# Patient Record
Sex: FEMALE | NOT HISPANIC OR LATINO | URBAN - METROPOLITAN AREA
[De-identification: names, ages, dates, MRNs, and addresses within clinical notes are randomized per-mention and may not be internally consistent; named-entity substitution may affect disease eponyms.]

---

## 2018-03-07 ENCOUNTER — IMPORTED ENCOUNTER (OUTPATIENT)
Dept: URBAN - METROPOLITAN AREA CLINIC 1 | Facility: CLINIC | Age: 83
End: 2018-03-07

## 2018-03-07 PROBLEM — Z96.1: Noted: 2018-03-07

## 2018-03-07 PROBLEM — H04.123: Noted: 2018-03-07

## 2018-03-07 PROBLEM — H01.001: Noted: 2018-03-07

## 2018-03-07 PROBLEM — H01.004: Noted: 2018-03-07

## 2018-03-07 PROBLEM — H10.45: Noted: 2018-03-07

## 2018-03-07 PROBLEM — H40.013: Noted: 2018-03-07

## 2018-03-07 PROCEDURE — 92004 COMPRE OPH EXAM NEW PT 1/>: CPT

## 2018-03-07 PROCEDURE — 92250 FUNDUS PHOTOGRAPHY W/I&R: CPT

## 2018-03-07 PROCEDURE — 92015 DETERMINE REFRACTIVE STATE: CPT

## 2018-03-07 NOTE — PATIENT DISCUSSION
1.  Dry Eyes OU -- Recommended to patient to use Artificial Tears BID OU2. Anterior Blepharitis OU - Daily warm compresses and lid scrubs were recommended. 3. Pseudophakia OU - 4. Allergic Conjunctivitis OU :  Condition discussed with patient. Advised patient to use OTC Zaditor one drop OU BID prn.5. 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 Condition was discussed with patient. Will monitor patient for progression. Patient was asked to return to office in 1 month for 10/OCT/VF 24-2. Photos was done 6. Return for an appointment in 1 month for 10 OCT and VF 24-2 with Dr. Nate Gallardo.

## 2018-04-11 ENCOUNTER — IMPORTED ENCOUNTER (OUTPATIENT)
Dept: URBAN - METROPOLITAN AREA CLINIC 1 | Facility: CLINIC | Age: 83
End: 2018-04-11

## 2018-04-11 PROBLEM — H40.013: Noted: 2018-04-11

## 2018-04-11 PROCEDURE — 92083 EXTENDED VISUAL FIELD XM: CPT

## 2018-04-11 PROCEDURE — 92133 CPTRZD OPH DX IMG PST SGM ON: CPT

## 2018-04-11 PROCEDURE — 99213 OFFICE O/P EST LOW 20 MIN: CPT

## 2018-04-11 NOTE — PATIENT DISCUSSION
1. COAG Suspect OU: (0.65/0.65)  IOP was 16/16. Condition was discussed with patient. Will monitor patient for progression. OCT was normal.  VF was non specific loss OS 2. Return for an appointment in 1 year for 30 and OCT. with Dr. Sarah Bledsoe.

## 2019-10-25 ENCOUNTER — IMPORTED ENCOUNTER (OUTPATIENT)
Dept: URBAN - METROPOLITAN AREA CLINIC 1 | Facility: CLINIC | Age: 84
End: 2019-10-25

## 2019-10-25 PROBLEM — H01.001: Noted: 2019-10-25

## 2019-10-25 PROBLEM — H16.143: Noted: 2019-10-25

## 2019-10-25 PROBLEM — H40.013: Noted: 2019-10-25

## 2019-10-25 PROBLEM — H43.812: Noted: 2019-10-25

## 2019-10-25 PROBLEM — Z96.1: Noted: 2019-10-25

## 2019-10-25 PROBLEM — H04.123: Noted: 2019-10-25

## 2019-10-25 PROBLEM — H01.004: Noted: 2019-10-25

## 2019-10-25 PROBLEM — H01.002: Noted: 2019-10-25

## 2019-10-25 PROBLEM — H01.005: Noted: 2019-10-25

## 2019-10-25 PROCEDURE — 92014 COMPRE OPH EXAM EST PT 1/>: CPT

## 2019-10-25 PROCEDURE — 92015 DETERMINE REFRACTIVE STATE: CPT

## 2019-10-25 PROCEDURE — 92133 CPTRZD OPH DX IMG PST SGM ON: CPT

## 2019-10-25 NOTE — PATIENT DISCUSSION
1.  Glaucoma Suspect OU (CD: 0.65 OU) -- IOP 15 / 16 today. Negative Family h/o Glaucoma. OCT today shows WNL OU. Patient is considered Low Risk. Condition was discussed with patient and patient understands. Will continue to monitor patient for any progression in condition. Patient was advised to call us with any problems questions or concerns. 2.  THUY w/ PEK OU -- Recommend increase the frequent use of OTC AT's BID-QID OU Routinely (Sample of Systane Complete Given). 3.  Anterior Blepharitis OU -- Recommend Hot Moist Compresses and Lid Scrubs / Baby Shampoo QHS OU PRN. 4. Pseudophakia OU (Standard OU) -- Doing well. 5.  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 Finalized Glasses MRx was given to patient today. Return for an appointment in 1 YR for a 30 OU with Dr. Jean Ibarra.

## 2020-05-27 ENCOUNTER — IMPORTED ENCOUNTER (OUTPATIENT)
Dept: URBAN - METROPOLITAN AREA CLINIC 1 | Facility: CLINIC | Age: 85
End: 2020-05-27

## 2020-05-27 PROBLEM — H01.001: Noted: 2020-05-27

## 2020-05-27 PROBLEM — H04.123: Noted: 2020-05-27

## 2020-05-27 PROBLEM — H01.004: Noted: 2020-05-27

## 2020-05-27 PROBLEM — H16.143: Noted: 2020-05-27

## 2020-05-27 PROCEDURE — 92012 INTRM OPH EXAM EST PATIENT: CPT

## 2020-05-27 NOTE — PATIENT DISCUSSION
1.  THUY w/ PEK OU -- Increased PEK noted OU. Recommend patient switch to PF ATs and increase to Q2h OU. 2.  Anterior Blepharitis OU -- Recommend Hot Moist Compresses and Lid Scrubs / Baby Shampoo QHS OU PRN. 3.  Glaucoma Suspect OU (CD: 0.65 OU) --  Negative Family h/o Glaucoma. Patient is considered Low Risk. Condition was discussed with patient and patient understands. Will continue to monitor patient for any progression in condition. Patient was advised to call us with any problems questions or concerns. 4.  Pseudophakia OU (Standard OU) -- Doing well. 5.  H/o PVD w/o Tear OS -- RD precautions given. Return for an appointment in As scheduled for a 30 with Dr. Carly Warner.

## 2020-09-15 NOTE — PATIENT DISCUSSION
PHOTOGRAPHS: I have reviewed the external ocular photographs of this patient which show the following: several lesions right upper eyelid and left lower eyelid.

## 2020-09-15 NOTE — PATIENT DISCUSSION
Biopsy Lesion Eyelid: The patient had a lesion on the right upper eyelid. After informed consent the patient received a local anesthetic injection of 1% lidocaine with epinephrine 1:100,000 units. A section of the mass was excised with Alan scissors and sent to the pathology laboratory for evaluation. The patient was given written post operative care instructions and a prescription for antibiotic ointment. The patient was asked to call our office within 10 days for follow up if the patient had not heard from our office regarding the result of the biopsy before that time.

## 2020-09-15 NOTE — PATIENT DISCUSSION
Biopsy Lesion Eyelid: The patient had a lesion on the left lower eyelid. After informed consent the patient received a local anesthetic injection of 1% lidocaine with epinephrine 1:100,000 units. A section of the mass was excised with Alan scissors and sent to the pathology laboratory for evaluation. The patient was given written post operative care instructions and a prescription for antibiotic ointment. The patient was asked to call our office within 10 days for follow up if the patient had not heard from our office regarding the result of the biopsy before that time.

## 2020-10-27 ENCOUNTER — IMPORTED ENCOUNTER (OUTPATIENT)
Dept: URBAN - METROPOLITAN AREA CLINIC 1 | Facility: CLINIC | Age: 85
End: 2020-10-27

## 2020-10-27 PROBLEM — H01.001: Noted: 2020-10-27

## 2020-10-27 PROBLEM — H40.013: Noted: 2020-10-27

## 2020-10-27 PROBLEM — Z96.1: Noted: 2020-10-27

## 2020-10-27 PROBLEM — H16.143: Noted: 2020-10-27

## 2020-10-27 PROBLEM — H01.004: Noted: 2020-10-27

## 2020-10-27 PROBLEM — H04.123: Noted: 2020-10-27

## 2020-10-27 PROCEDURE — 92014 COMPRE OPH EXAM EST PT 1/>: CPT

## 2020-10-27 NOTE — PATIENT DISCUSSION
1.  Glaucoma Suspect OU (CD: 0.65 OU) -- IOP 16/15 today. Negative Family h/o Glaucoma. Patient is considered Low Risk. Condition was discussed with patient and patient understands. Will continue to monitor patient for any progression in condition. Patient was advised to call us with any problems questions or concerns. 2.  THUY w/ PEK OU -- Recommend increase the frequent use of OTC AT's BID-QID OU Routinely (Sample of Systane Complete Given). 3.  Anterior Blepharitis OU -- Recommend Hot Moist Compresses and Lid Scrubs / Baby Shampoo QHS OU PRN. 4. Pseudophakia OU (Standard OU) -- Doing well. 5.  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 Patient deferred Manifest Rx today. Return for an appointment in 1 YR for a 30/OCT with Dr. Matthew Putnam.

## 2021-10-27 ENCOUNTER — IMPORTED ENCOUNTER (OUTPATIENT)
Dept: URBAN - METROPOLITAN AREA CLINIC 1 | Facility: CLINIC | Age: 86
End: 2021-10-27

## 2021-10-27 PROBLEM — H04.123: Noted: 2021-10-27

## 2021-10-27 PROBLEM — H43.812: Noted: 2021-10-27

## 2021-10-27 PROBLEM — Z96.1: Noted: 2021-10-27

## 2021-10-27 PROBLEM — H40.013: Noted: 2021-10-27

## 2021-10-27 PROBLEM — H01.004: Noted: 2021-10-27

## 2021-10-27 PROBLEM — H01.001: Noted: 2021-10-27

## 2021-10-27 PROBLEM — H16.143: Noted: 2021-10-27

## 2021-10-27 PROCEDURE — 92015 DETERMINE REFRACTIVE STATE: CPT

## 2021-10-27 PROCEDURE — 92133 CPTRZD OPH DX IMG PST SGM ON: CPT

## 2021-10-27 PROCEDURE — 99214 OFFICE O/P EST MOD 30 MIN: CPT

## 2021-10-27 NOTE — PATIENT DISCUSSION
1.  Glaucoma Suspect OU -- (CD: 0.65 OU) No progression by OCT. IOP stable at 16/15. (-) Family Hx. **Testing to be done every other year per [facility]** Patient is considered Low Risk. Condition was discussed with patient and patient understands. Will continue to monitor patient for any progression in condition. Patient was advised to call us with any problems questions or concerns. 2.  THUY w/ PEK OU -- Cont ATs QID OU routinely. 3.  Anterior Blepharitis OU -- Daily Hot compresses and lid scrubs were recommended. 4. Pseudophakia OU (Standard OU) -- Doing well. 5.  PVD w/o Tear OS -- Stable. RD Precautions. MRx for glasses given to patient. Return for an appointment in 1 year 27 with Dr. Ama Singleton.

## 2022-04-02 ASSESSMENT — VISUAL ACUITY
OS_SC: 20/25
OS_SC: 20/20
OD_CC: 20/25-1
OD_SC: 20/25
OS_CC: J1
OS_CC: 20/40-1
OS_SC: 20/25
OD_CC: J1
OD_CC: J2
OS_CC: 20/25
OS_CC: 20/25
OD_CC: 20/25
OS_CC: J1
OD_SC: 20/25
OD_GLARE: 20/25
OD_SC: 20/25
OS_CC: J2
OD_SC: 20/25
OD_CC: 20/30
OD_CC: J1
OD_CC: 20/30
OS_GLARE: 20/25
OS_CC: 20/30
OS_SC: 20/25

## 2022-04-02 ASSESSMENT — TONOMETRY
OD_IOP_MMHG: 16
OS_IOP_MMHG: 17
OD_IOP_MMHG: 16
OD_IOP_MMHG: 16
OD_IOP_MMHG: 15
OS_IOP_MMHG: 16
OD_IOP_MMHG: 16
OD_IOP_MMHG: 17
OS_IOP_MMHG: 16
OS_IOP_MMHG: 15
OS_IOP_MMHG: 16
OS_IOP_MMHG: 15

## 2022-04-02 ASSESSMENT — KERATOMETRY
OD_K1POWER_DIOPTERS: 42.75
OD_K2POWER_DIOPTERS: 41.50
OS_K1POWER_DIOPTERS: 43.50
OS_AXISANGLE_DEGREES: 021
OD_AXISANGLE_DEGREES: 037
OD_AXISANGLE2_DEGREES: 127
OS_AXISANGLE2_DEGREES: 111
OS_K2POWER_DIOPTERS: 42.50

## 2022-11-02 ENCOUNTER — COMPREHENSIVE EXAM (OUTPATIENT)
Dept: URBAN - METROPOLITAN AREA CLINIC 1 | Facility: CLINIC | Age: 87
End: 2022-11-02

## 2022-11-02 DIAGNOSIS — Z96.1: ICD-10-CM

## 2022-11-02 DIAGNOSIS — H43.812: ICD-10-CM

## 2022-11-02 DIAGNOSIS — H04.123: ICD-10-CM

## 2022-11-02 DIAGNOSIS — H40.013: ICD-10-CM

## 2022-11-02 PROCEDURE — 99214 OFFICE O/P EST MOD 30 MIN: CPT

## 2022-11-02 ASSESSMENT — VISUAL ACUITY
OS_CC: 20/80
OD_BAT: 20/50
OD_CC: J1
OD_CC: 20/20
OS_CC: J3
OS_BAT: 20/200

## 2022-11-02 ASSESSMENT — TONOMETRY
OS_IOP_MMHG: 16
OD_IOP_MMHG: 16

## 2022-11-02 NOTE — PATIENT DISCUSSION
(CD: 0.65 OU) No testing performed today. IOP stable at 16 OU. (-) Family Hx. **Testing to be done every other year per [facility]** Patient is considered Low Risk. Condition was discussed with patient and patient understands. Will continue to monitor patient for any progression in condition. Patient was advised to call us with any problems questions or concerns.

## 2023-11-08 ENCOUNTER — COMPREHENSIVE EXAM (OUTPATIENT)
Dept: URBAN - METROPOLITAN AREA CLINIC 1 | Facility: CLINIC | Age: 88
End: 2023-11-08

## 2023-11-08 DIAGNOSIS — H35.3122: ICD-10-CM

## 2023-11-08 DIAGNOSIS — H02.831: ICD-10-CM

## 2023-11-08 DIAGNOSIS — H02.834: ICD-10-CM

## 2023-11-08 DIAGNOSIS — H43.812: ICD-10-CM

## 2023-11-08 DIAGNOSIS — Z96.1: ICD-10-CM

## 2023-11-08 DIAGNOSIS — H40.013: ICD-10-CM

## 2023-11-08 DIAGNOSIS — H04.123: ICD-10-CM

## 2023-11-08 PROCEDURE — 99214 OFFICE O/P EST MOD 30 MIN: CPT

## 2023-11-08 PROCEDURE — 92133 CPTRZD OPH DX IMG PST SGM ON: CPT

## 2023-11-08 ASSESSMENT — VISUAL ACUITY
OD_BAT: 20/50
OS_CC: 20/100
OD_CC: J3
OD_CC: 20/30
OS_BAT: 20/200
OS_CC: J3

## 2023-11-08 ASSESSMENT — TONOMETRY
OD_IOP_MMHG: 10
OS_IOP_MMHG: 10

## 2024-05-15 ENCOUNTER — FOLLOW UP (OUTPATIENT)
Dept: URBAN - METROPOLITAN AREA CLINIC 1 | Facility: CLINIC | Age: 89
End: 2024-05-15

## 2024-05-15 DIAGNOSIS — H35.3122: ICD-10-CM

## 2024-05-15 PROCEDURE — 99213 OFFICE O/P EST LOW 20 MIN: CPT

## 2024-05-15 ASSESSMENT — VISUAL ACUITY
OD_CC: 20/30
OS_CC: J2
OD_CC: J16
OS_CC: 20/400
OS_SC: 20/150
OD_SC: 20/25

## 2024-05-15 ASSESSMENT — TONOMETRY
OD_IOP_MMHG: 10
OS_IOP_MMHG: 10

## 2024-11-20 ENCOUNTER — COMPREHENSIVE EXAM (OUTPATIENT)
Dept: URBAN - METROPOLITAN AREA CLINIC 1 | Facility: CLINIC | Age: 89
End: 2024-11-20

## 2024-11-20 DIAGNOSIS — H43.812: ICD-10-CM

## 2024-11-20 DIAGNOSIS — Z96.1: ICD-10-CM

## 2024-11-20 DIAGNOSIS — H04.123: ICD-10-CM

## 2024-11-20 DIAGNOSIS — H02.831: ICD-10-CM

## 2024-11-20 DIAGNOSIS — H40.013: ICD-10-CM

## 2024-11-20 DIAGNOSIS — H35.3122: ICD-10-CM

## 2024-11-20 DIAGNOSIS — H02.834: ICD-10-CM

## 2024-11-20 PROCEDURE — 99214 OFFICE O/P EST MOD 30 MIN: CPT

## 2025-05-21 ENCOUNTER — FOLLOW UP (OUTPATIENT)
Age: OVER 89
End: 2025-05-21

## 2025-05-21 DIAGNOSIS — H35.3222: ICD-10-CM

## 2025-05-21 DIAGNOSIS — H35.3111: ICD-10-CM

## 2025-05-21 PROCEDURE — 99213 OFFICE O/P EST LOW 20 MIN: CPT
